# Patient Record
Sex: FEMALE | Race: WHITE | NOT HISPANIC OR LATINO | Employment: OTHER | URBAN - METROPOLITAN AREA
[De-identification: names, ages, dates, MRNs, and addresses within clinical notes are randomized per-mention and may not be internally consistent; named-entity substitution may affect disease eponyms.]

---

## 2021-01-26 ENCOUNTER — APPOINTMENT (OUTPATIENT)
Dept: GENERAL RADIOLOGY | Facility: HOSPITAL | Age: 51
End: 2021-01-26

## 2021-01-26 ENCOUNTER — APPOINTMENT (OUTPATIENT)
Dept: CT IMAGING | Facility: HOSPITAL | Age: 51
End: 2021-01-26

## 2021-01-26 ENCOUNTER — HOSPITAL ENCOUNTER (EMERGENCY)
Facility: HOSPITAL | Age: 51
Discharge: HOME | End: 2021-01-26
Attending: EMERGENCY MEDICINE | Admitting: EMERGENCY MEDICINE

## 2021-01-26 VITALS
OXYGEN SATURATION: 95 % | BODY MASS INDEX: 19.94 KG/M2 | TEMPERATURE: 98.5 F | HEIGHT: 58 IN | HEART RATE: 106 BPM | SYSTOLIC BLOOD PRESSURE: 109 MMHG | RESPIRATION RATE: 18 BRPM | DIASTOLIC BLOOD PRESSURE: 75 MMHG | WEIGHT: 95 LBS

## 2021-01-26 DIAGNOSIS — R07.89 CHEST WALL PAIN: ICD-10-CM

## 2021-01-26 DIAGNOSIS — S09.90XA CLOSED HEAD INJURY, INITIAL ENCOUNTER: ICD-10-CM
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
## 2021-01-26 DIAGNOSIS — S00.83XA CONTUSION OF FACE, INITIAL ENCOUNTER: ICD-10-CM

## 2021-01-26 DIAGNOSIS — S63.501A SPRAIN OF RIGHT WRIST, INITIAL ENCOUNTER: ICD-10-CM

## 2021-01-26 LAB
ALBUMIN SERPL-MCNC: 4 G/DL (ref 3.5–5.2)
ALBUMIN/GLOB SERPL: 1.7 G/DL
ALP SERPL-CCNC: 120 U/L (ref 39–117)
ALT SERPL W P-5'-P-CCNC: 28 U/L (ref 1–33)
ANION GAP SERPL CALCULATED.3IONS-SCNC: 5.1 MMOL/L (ref 5–15)
APTT PPP: 23.9 SECONDS (ref 22.7–35.4)
AST SERPL-CCNC: 26 U/L (ref 1–32)
BASOPHILS # BLD AUTO: 0.05 10*3/MM3 (ref 0–0.2)
BASOPHILS NFR BLD AUTO: 0.9 % (ref 0–1.5)
BILIRUB SERPL-MCNC: 0.3 MG/DL (ref 0–1.2)
BUN SERPL-MCNC: 23 MG/DL (ref 6–20)
BUN/CREAT SERPL: 56.1 (ref 7–25)
CALCIUM SPEC-SCNC: 8.9 MG/DL (ref 8.6–10.5)
CHLORIDE SERPL-SCNC: 108 MMOL/L (ref 98–107)
CO2 SERPL-SCNC: 28.9 MMOL/L (ref 22–29)
CREAT SERPL-MCNC: 0.41 MG/DL (ref 0.57–1)
DEPRECATED RDW RBC AUTO: 50.9 FL (ref 37–54)
EOSINOPHIL # BLD AUTO: 0.06 10*3/MM3 (ref 0–0.4)
EOSINOPHIL NFR BLD AUTO: 1.1 % (ref 0.3–6.2)
ERYTHROCYTE [DISTWIDTH] IN BLOOD BY AUTOMATED COUNT: 15.3 % (ref 12.3–15.4)
GFR SERPL CREATININE-BSD FRML MDRD: >150 ML/MIN/1.73
GLOBULIN UR ELPH-MCNC: 2.4 GM/DL
GLUCOSE BLDC GLUCOMTR-MCNC: 133 MG/DL (ref 70–130)
GLUCOSE SERPL-MCNC: 92 MG/DL (ref 65–99)
HCG SERPL QL: NEGATIVE
HCT VFR BLD AUTO: 40.2 % (ref 34–46.6)
HGB BLD-MCNC: 13.3 G/DL (ref 12–15.9)
IMM GRANULOCYTES # BLD AUTO: 0.03 10*3/MM3 (ref 0–0.05)
IMM GRANULOCYTES NFR BLD AUTO: 0.6 % (ref 0–0.5)
INR PPP: 0.89 (ref 0.9–1.1)
LIPASE SERPL-CCNC: 16 U/L (ref 13–60)
LYMPHOCYTES # BLD AUTO: 2.29 10*3/MM3 (ref 0.7–3.1)
LYMPHOCYTES NFR BLD AUTO: 43.5 % (ref 19.6–45.3)
MCH RBC QN AUTO: 30.7 PG (ref 26.6–33)
MCHC RBC AUTO-ENTMCNC: 33.1 G/DL (ref 31.5–35.7)
MCV RBC AUTO: 92.8 FL (ref 79–97)
MONOCYTES # BLD AUTO: 0.51 10*3/MM3 (ref 0.1–0.9)
MONOCYTES NFR BLD AUTO: 9.7 % (ref 5–12)
NEUTROPHILS NFR BLD AUTO: 2.33 10*3/MM3 (ref 1.7–7)
NEUTROPHILS NFR BLD AUTO: 44.2 % (ref 42.7–76)
NRBC BLD AUTO-RTO: 0 /100 WBC (ref 0–0.2)
PLATELET # BLD AUTO: 306 10*3/MM3 (ref 140–450)
PMV BLD AUTO: 10.7 FL (ref 6–12)
POTASSIUM SERPL-SCNC: 3.6 MMOL/L (ref 3.5–5.2)
PROT SERPL-MCNC: 6.4 G/DL (ref 6–8.5)
PROTHROMBIN TIME: 11.9 SECONDS (ref 11.7–14.2)
QT INTERVAL: 346 MS
RBC # BLD AUTO: 4.33 10*6/MM3 (ref 3.77–5.28)
SODIUM SERPL-SCNC: 142 MMOL/L (ref 136–145)
TROPONIN T SERPL-MCNC: <0.01 NG/ML (ref 0–0.03)
WBC # BLD AUTO: 5.27 10*3/MM3 (ref 3.4–10.8)

## 2021-01-26 PROCEDURE — 80053 COMPREHEN METABOLIC PANEL: CPT | Performed by: EMERGENCY MEDICINE

## 2021-01-26 PROCEDURE — 84703 CHORIONIC GONADOTROPIN ASSAY: CPT | Performed by: EMERGENCY MEDICINE

## 2021-01-26 PROCEDURE — 85730 THROMBOPLASTIN TIME PARTIAL: CPT | Performed by: EMERGENCY MEDICINE

## 2021-01-26 PROCEDURE — 96374 THER/PROPH/DIAG INJ IV PUSH: CPT

## 2021-01-26 PROCEDURE — 76376 3D RENDER W/INTRP POSTPROCES: CPT

## 2021-01-26 PROCEDURE — 99284 EMERGENCY DEPT VISIT MOD MDM: CPT

## 2021-01-26 PROCEDURE — 90471 IMMUNIZATION ADMIN: CPT | Performed by: EMERGENCY MEDICINE

## 2021-01-26 PROCEDURE — 93005 ELECTROCARDIOGRAM TRACING: CPT | Performed by: EMERGENCY MEDICINE

## 2021-01-26 PROCEDURE — 36415 COLL VENOUS BLD VENIPUNCTURE: CPT

## 2021-01-26 PROCEDURE — 83690 ASSAY OF LIPASE: CPT | Performed by: EMERGENCY MEDICINE

## 2021-01-26 PROCEDURE — 93010 ELECTROCARDIOGRAM REPORT: CPT | Performed by: INTERNAL MEDICINE

## 2021-01-26 PROCEDURE — 25010000002 MORPHINE PER 10 MG: Performed by: EMERGENCY MEDICINE

## 2021-01-26 PROCEDURE — 96375 TX/PRO/DX INJ NEW DRUG ADDON: CPT

## 2021-01-26 PROCEDURE — 73110 X-RAY EXAM OF WRIST: CPT

## 2021-01-26 PROCEDURE — 25010000002 IOPAMIDOL 61 % SOLUTION: Performed by: EMERGENCY MEDICINE

## 2021-01-26 PROCEDURE — 25010000002 TDAP 5-2.5-18.5 LF-MCG/0.5 SUSPENSION: Performed by: EMERGENCY MEDICINE

## 2021-01-26 PROCEDURE — 82962 GLUCOSE BLOOD TEST: CPT

## 2021-01-26 PROCEDURE — 90715 TDAP VACCINE 7 YRS/> IM: CPT | Performed by: EMERGENCY MEDICINE

## 2021-01-26 PROCEDURE — 85610 PROTHROMBIN TIME: CPT | Performed by: EMERGENCY MEDICINE

## 2021-01-26 PROCEDURE — 85025 COMPLETE CBC W/AUTO DIFF WBC: CPT | Performed by: EMERGENCY MEDICINE

## 2021-01-26 PROCEDURE — 84484 ASSAY OF TROPONIN QUANT: CPT | Performed by: EMERGENCY MEDICINE

## 2021-01-26 PROCEDURE — 25010000002 ONDANSETRON PER 1 MG: Performed by: EMERGENCY MEDICINE

## 2021-01-26 PROCEDURE — 71260 CT THORAX DX C+: CPT

## 2021-01-26 PROCEDURE — 96361 HYDRATE IV INFUSION ADD-ON: CPT

## 2021-01-26 RX ORDER — PAROXETINE HYDROCHLORIDE 20 MG/1
20 TABLET, FILM COATED ORAL EVERY MORNING
COMMUNITY

## 2021-01-26 RX ORDER — IBUPROFEN 600 MG/1
600 TABLET ORAL EVERY 6 HOURS PRN
Qty: 30 TABLET | Refills: 0 | Status: SHIPPED | OUTPATIENT
Start: 2021-01-26

## 2021-01-26 RX ORDER — MORPHINE SULFATE 2 MG/ML
2 INJECTION, SOLUTION INTRAMUSCULAR; INTRAVENOUS ONCE AS NEEDED
Status: COMPLETED | OUTPATIENT
Start: 2021-01-26 | End: 2021-01-26

## 2021-01-26 RX ORDER — GABAPENTIN 300 MG/1
300 CAPSULE ORAL 3 TIMES DAILY
COMMUNITY

## 2021-01-26 RX ORDER — ONDANSETRON 2 MG/ML
4 INJECTION INTRAMUSCULAR; INTRAVENOUS ONCE AS NEEDED
Status: COMPLETED | OUTPATIENT
Start: 2021-01-26 | End: 2021-01-26

## 2021-01-26 RX ORDER — INSULIN GLARGINE 100 [IU]/ML
14 INJECTION, SOLUTION SUBCUTANEOUS DAILY
COMMUNITY

## 2021-01-26 RX ORDER — CYCLOBENZAPRINE HCL 5 MG
5 TABLET ORAL 3 TIMES DAILY PRN
Qty: 15 TABLET | Refills: 0 | Status: SHIPPED | OUTPATIENT
Start: 2021-01-26

## 2021-01-26 RX ADMIN — SODIUM CHLORIDE 500 ML: 9 INJECTION, SOLUTION INTRAVENOUS at 21:09

## 2021-01-26 RX ADMIN — TETANUS TOXOID, REDUCED DIPHTHERIA TOXOID AND ACELLULAR PERTUSSIS VACCINE, ADSORBED 0.5 ML: 5; 2.5; 8; 8; 2.5 SUSPENSION INTRAMUSCULAR at 20:39

## 2021-01-26 RX ADMIN — IOPAMIDOL 75 ML: 612 INJECTION, SOLUTION INTRAVENOUS at 21:52

## 2021-01-26 RX ADMIN — MORPHINE SULFATE 2 MG: 2 INJECTION, SOLUTION INTRAMUSCULAR; INTRAVENOUS at 20:36

## 2021-01-26 RX ADMIN — ONDANSETRON 4 MG: 2 INJECTION INTRAMUSCULAR; INTRAVENOUS at 20:36

## 2021-02-02 ENCOUNTER — HOSPITAL ENCOUNTER (EMERGENCY)
Facility: HOSPITAL | Age: 51
Discharge: HOME | End: 2021-02-02
Attending: EMERGENCY MEDICINE | Admitting: EMERGENCY MEDICINE

## 2021-02-02 VITALS
WEIGHT: 99 LBS | HEART RATE: 99 BPM | HEIGHT: 58 IN | BODY MASS INDEX: 20.78 KG/M2 | DIASTOLIC BLOOD PRESSURE: 81 MMHG | SYSTOLIC BLOOD PRESSURE: 114 MMHG | OXYGEN SATURATION: 96 % | TEMPERATURE: 97.5 F | RESPIRATION RATE: 17 BRPM

## 2021-02-02 DIAGNOSIS — E10.649 HYPOGLYCEMIA DUE TO TYPE 1 DIABETES MELLITUS (HCC): Primary | ICD-10-CM

## 2021-02-02 LAB
GLUCOSE BLDC GLUCOMTR-MCNC: 100 MG/DL (ref 70–130)
GLUCOSE BLDC GLUCOMTR-MCNC: 138 MG/DL (ref 70–130)
GLUCOSE BLDC GLUCOMTR-MCNC: 312 MG/DL (ref 70–130)
GLUCOSE BLDC GLUCOMTR-MCNC: 95 MG/DL (ref 70–130)

## 2021-02-02 PROCEDURE — 99283 EMERGENCY DEPT VISIT LOW MDM: CPT

## 2021-02-02 PROCEDURE — 82962 GLUCOSE BLOOD TEST: CPT

## 2021-02-02 NOTE — ED NOTES
Patient was placed in face mask in first look. Patient was wearing facemask when I entered the room and throughout our encounter. I wore full protective equipment throughout this patient encounter including a face mask,eye protection, and gloves. Hand hygiene was performed before donning protective equipment and after removal when leaving the room.       Violeta Corea RN  02/02/21 1555

## 2021-02-02 NOTE — ED NOTES
Pt presents to ED via EMS from home. Pt family called for AMS. Upon EMS arrival pt was sitting on the bed and was altered. Pt blood glucose was 29, pt was given 25 grams of D-10 IV, and blood glucose increased to 191. Pt is currently A&OX4, able to ambulate, and in a mask. Pt denies any complains at this time.      Blessing Watt, RN  02/02/21 1538

## 2021-02-02 NOTE — ED NOTES
Patient given two boxed lunches, per provider request.  Set up only.      Ruddy Arriola RN  02/02/21 7536

## 2021-02-02 NOTE — ED NOTES
Patient reports having a hypoglycemic episode today. EMS reported glucose as 29. Patient states she was laying in bed talking to her son when she passed out and came to after EMS arrived. Patient is now A&O x 4. ABCs intact.    Violeta Corea RN  02/02/21 154       Violeta Corea RN  02/02/21 1546       Violeta Corea RN  02/02/21 1627
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
## 2021-02-02 NOTE — DISCHARGE INSTRUCTIONS
Make sure you don't miss meals    Although you are being discharged from the ED today, I encourage you to return for worsening symptoms. Things can, and do, change such that treatment at home with medication may not be adequate. Specifically I recommend returning for chest pain or discomfort, difficulty breathing, persistent vomiting or difficulty holding down liquids or medications, fever > 102.0 F,  or any other worsening or alarming symptoms.     Rest. Drink plenty of fluids.  Follow up with PCP or provider listed for further evaluation and management.  Follow up with primary care provider for further management and to have blood pressure rechecked.  Take all medications as prescribed.

## 2021-02-02 NOTE — ED NOTES
Blood glucose checked after second box lunch consumed, patient blood glucose 100.  Provider Arin REN NP notified.       Ruddy Arriola RN  02/02/21 7385

## 2021-02-03 NOTE — ED PROVIDER NOTES
MD ATTESTATION NOTE    The NELSON and I have discussed this patient's history, physical exam, and treatment plan.  I have reviewed the documentation and personally had a face to face interaction with the patient. I affirm the documentation and agree with the treatment and plan.  The attached note describes my personal findings.      Betsy Forde is a 50 y.o. female who presents to the ED c/o hypoglycemia.  Patient took short and long-acting insulin without eating following the administration of insulin.  Glucose was 29 at home.  She states she is now feeling well and she is eating.      On exam:  ANO x3, GCS 15, patient is eating multiple sandwiches in the emergency department  Regular rate and rhythm  Clear to auscultation bilaterally    Labs  Recent Results (from the past 24 hour(s))   POC Glucose Once    Collection Time: 02/02/21  3:39 PM    Specimen: Blood   Result Value Ref Range    Glucose 95 70 - 130 mg/dL   POC Glucose Once    Collection Time: 02/02/21  4:15 PM    Specimen: Blood   Result Value Ref Range    Glucose 100 70 - 130 mg/dL   POC Glucose Once    Collection Time: 02/02/21  5:12 PM    Specimen: Blood   Result Value Ref Range    Glucose 138 (H) 70 - 130 mg/dL   POC Glucose Once    Collection Time: 02/02/21  6:51 PM    Specimen: Blood   Result Value Ref Range    Glucose 312 (H) 70 - 130 mg/dL       Radiology  No Radiology Exams Resulted Within Past 24 Hours    Medical Decision Making:  ED Course as of Feb 03 0001 Tue Feb 02, 2021   1627 Glucose: 100 [TD]   1856 Glucose(!): 312 [EW]   1857 Patient's blood glucose is now 312.  This is after she has eaten multiple lunch boxes and drank multiple containers of milk.  I will send her home.    [EW]      ED Course User Index  [EW] Arin Patino APRN  [TD] Ulysses Watt II, MD           PPE: Both the patient and I wore a surgical mask throughout the entire patient encounter. I wore protective goggles.     Diagnosis  Final diagnoses:   Hypoglycemia due  to type 1 diabetes mellitus (CMS/[facility])        Ulysses Watt II, MD  02/03/21 0002